# Patient Record
Sex: FEMALE | Race: BLACK OR AFRICAN AMERICAN | NOT HISPANIC OR LATINO | URBAN - METROPOLITAN AREA
[De-identification: names, ages, dates, MRNs, and addresses within clinical notes are randomized per-mention and may not be internally consistent; named-entity substitution may affect disease eponyms.]

---

## 2020-08-28 ENCOUNTER — EMERGENCY (EMERGENCY)
Facility: HOSPITAL | Age: 60
LOS: 0 days | Discharge: HOME | End: 2020-08-29
Attending: EMERGENCY MEDICINE
Payer: MEDICAID

## 2020-08-28 VITALS
OXYGEN SATURATION: 96 % | WEIGHT: 110.01 LBS | DIASTOLIC BLOOD PRESSURE: 73 MMHG | RESPIRATION RATE: 20 BRPM | TEMPERATURE: 98 F | HEART RATE: 85 BPM | HEIGHT: 64 IN | SYSTOLIC BLOOD PRESSURE: 110 MMHG

## 2020-08-28 DIAGNOSIS — G47.00 INSOMNIA, UNSPECIFIED: ICD-10-CM

## 2020-08-28 DIAGNOSIS — R63.0 ANOREXIA: ICD-10-CM

## 2020-08-28 DIAGNOSIS — I10 ESSENTIAL (PRIMARY) HYPERTENSION: ICD-10-CM

## 2020-08-28 DIAGNOSIS — R07.9 CHEST PAIN, UNSPECIFIED: ICD-10-CM

## 2020-08-28 DIAGNOSIS — R00.2 PALPITATIONS: ICD-10-CM

## 2020-08-28 DIAGNOSIS — R42 DIZZINESS AND GIDDINESS: ICD-10-CM

## 2020-08-28 LAB
ALBUMIN SERPL ELPH-MCNC: 3.4 G/DL — SIGNIFICANT CHANGE UP (ref 3.3–5)
ALP SERPL-CCNC: 80 U/L — SIGNIFICANT CHANGE UP (ref 40–120)
ALT FLD-CCNC: 24 U/L — SIGNIFICANT CHANGE UP (ref 12–78)
ANION GAP SERPL CALC-SCNC: 9 MMOL/L — SIGNIFICANT CHANGE UP (ref 5–17)
AST SERPL-CCNC: 27 U/L — SIGNIFICANT CHANGE UP (ref 15–37)
BASOPHILS # BLD AUTO: 0.09 K/UL — SIGNIFICANT CHANGE UP (ref 0–0.2)
BASOPHILS NFR BLD AUTO: 1.1 % — SIGNIFICANT CHANGE UP (ref 0–2)
BILIRUB SERPL-MCNC: 0.4 MG/DL — SIGNIFICANT CHANGE UP (ref 0.2–1.2)
BUN SERPL-MCNC: 8 MG/DL — SIGNIFICANT CHANGE UP (ref 7–23)
CALCIUM SERPL-MCNC: 8.3 MG/DL — LOW (ref 8.5–10.1)
CHLORIDE SERPL-SCNC: 111 MMOL/L — HIGH (ref 96–108)
CO2 SERPL-SCNC: 22 MMOL/L — SIGNIFICANT CHANGE UP (ref 22–31)
CREAT SERPL-MCNC: 0.53 MG/DL — SIGNIFICANT CHANGE UP (ref 0.5–1.3)
EOSINOPHIL # BLD AUTO: 0.14 K/UL — SIGNIFICANT CHANGE UP (ref 0–0.5)
EOSINOPHIL NFR BLD AUTO: 1.7 % — SIGNIFICANT CHANGE UP (ref 0–6)
ETHANOL SERPL-MCNC: 101 MG/DL — HIGH (ref 0–10)
GLUCOSE SERPL-MCNC: 65 MG/DL — LOW (ref 70–99)
HCT VFR BLD CALC: 36.5 % — SIGNIFICANT CHANGE UP (ref 34.5–45)
HGB BLD-MCNC: 12.5 G/DL — SIGNIFICANT CHANGE UP (ref 11.5–15.5)
IMM GRANULOCYTES NFR BLD AUTO: 0.2 % — SIGNIFICANT CHANGE UP (ref 0–1.5)
LACTATE SERPL-SCNC: 2.6 MMOL/L — HIGH (ref 0.7–2)
LIDOCAIN IGE QN: 130 U/L — SIGNIFICANT CHANGE UP (ref 73–393)
LYMPHOCYTES # BLD AUTO: 3.17 K/UL — SIGNIFICANT CHANGE UP (ref 1–3.3)
LYMPHOCYTES # BLD AUTO: 39.4 % — SIGNIFICANT CHANGE UP (ref 13–44)
MAGNESIUM SERPL-MCNC: 2.2 MG/DL — SIGNIFICANT CHANGE UP (ref 1.6–2.6)
MCHC RBC-ENTMCNC: 29.9 PG — SIGNIFICANT CHANGE UP (ref 27–34)
MCHC RBC-ENTMCNC: 34.2 GM/DL — SIGNIFICANT CHANGE UP (ref 32–36)
MCV RBC AUTO: 87.3 FL — SIGNIFICANT CHANGE UP (ref 80–100)
MONOCYTES # BLD AUTO: 0.71 K/UL — SIGNIFICANT CHANGE UP (ref 0–0.9)
MONOCYTES NFR BLD AUTO: 8.8 % — SIGNIFICANT CHANGE UP (ref 2–14)
NEUTROPHILS # BLD AUTO: 3.91 K/UL — SIGNIFICANT CHANGE UP (ref 1.8–7.4)
NEUTROPHILS NFR BLD AUTO: 48.8 % — SIGNIFICANT CHANGE UP (ref 43–77)
NRBC # BLD: 0 /100 WBCS — SIGNIFICANT CHANGE UP (ref 0–0)
PLATELET # BLD AUTO: 291 K/UL — SIGNIFICANT CHANGE UP (ref 150–400)
POTASSIUM SERPL-MCNC: 3.7 MMOL/L — SIGNIFICANT CHANGE UP (ref 3.5–5.3)
POTASSIUM SERPL-SCNC: 3.7 MMOL/L — SIGNIFICANT CHANGE UP (ref 3.5–5.3)
PROT SERPL-MCNC: 7.1 GM/DL — SIGNIFICANT CHANGE UP (ref 6–8.3)
RBC # BLD: 4.18 M/UL — SIGNIFICANT CHANGE UP (ref 3.8–5.2)
RBC # FLD: 13.2 % — SIGNIFICANT CHANGE UP (ref 10.3–14.5)
SODIUM SERPL-SCNC: 142 MMOL/L — SIGNIFICANT CHANGE UP (ref 135–145)
TROPONIN I SERPL-MCNC: <.015 NG/ML — SIGNIFICANT CHANGE UP (ref 0.01–0.04)
TROPONIN I SERPL-MCNC: <.015 NG/ML — SIGNIFICANT CHANGE UP (ref 0.01–0.04)
TSH SERPL-MCNC: 0.59 UIU/ML — SIGNIFICANT CHANGE UP (ref 0.36–3.74)
WBC # BLD: 8.04 K/UL — SIGNIFICANT CHANGE UP (ref 3.8–10.5)
WBC # FLD AUTO: 8.04 K/UL — SIGNIFICANT CHANGE UP (ref 3.8–10.5)

## 2020-08-28 PROCEDURE — 71045 X-RAY EXAM CHEST 1 VIEW: CPT | Mod: 26

## 2020-08-28 PROCEDURE — 93010 ELECTROCARDIOGRAM REPORT: CPT

## 2020-08-28 PROCEDURE — 99285 EMERGENCY DEPT VISIT HI MDM: CPT

## 2020-08-28 RX ORDER — SODIUM CHLORIDE 9 MG/ML
1000 INJECTION INTRAMUSCULAR; INTRAVENOUS; SUBCUTANEOUS ONCE
Refills: 0 | Status: COMPLETED | OUTPATIENT
Start: 2020-08-28 | End: 2020-08-28

## 2020-08-28 RX ORDER — ONDANSETRON 8 MG/1
4 TABLET, FILM COATED ORAL ONCE
Refills: 0 | Status: COMPLETED | OUTPATIENT
Start: 2020-08-28 | End: 2020-08-28

## 2020-08-28 RX ORDER — MECLIZINE HCL 12.5 MG
50 TABLET ORAL ONCE
Refills: 0 | Status: COMPLETED | OUTPATIENT
Start: 2020-08-28 | End: 2020-08-28

## 2020-08-28 RX ADMIN — SODIUM CHLORIDE 1000 MILLILITER(S): 9 INJECTION INTRAMUSCULAR; INTRAVENOUS; SUBCUTANEOUS at 21:18

## 2020-08-28 NOTE — ED PROVIDER NOTE - CLINICAL SUMMARY MEDICAL DECISION MAKING FREE TEXT BOX
Pt well appearing. Pt further elaborates and reports her main complain is lack of appetite and insomnia and that causes her to be lightheaded. Lab values do not require emergent intervention. CT scan demonstrates no acute pathology. advise fu with pmd. pt felt better after sleeping in ER.

## 2020-08-28 NOTE — ED PROVIDER NOTE - OBJECTIVE STATEMENT
58 y/o F with pmhx HTN pw dizziness x2 weeks and sensation of room spinning when she moves from side to side. She also reports x2 months of occasional CP, palpitations and decreased appetite with weight loss and inability to sleep. Pt does drink alcohol occasionally.    No fever/chills, No photophobia/eye pain/changes in vision, No ear pain/sore throat/dysphagia, No chest pain/palpitations, no SOB/cough/wheeze/stridor, No abdominal pain, No N/V/D, no dysuria/frequency/discharge, No neck/back pain, no rash, no changes in neurological status/function. + dizziness. 60 y/o F with pmhx HTN pw dizziness x2 weeks and sensation of room spinning when she moves from side to side. She also reports x2 months of occasional CP, palpitations and decreased appetite with weight loss and inability to sleep. Pt has been drinking past 2-3 days for insomnia/anxiety. denies depression, si/hi/ah/vh.     No fever/chills, No photophobia/eye pain/changes in vision, No ear pain/sore throat/dysphagia, + chest pain, no palpitations, no SOB/cough/wheeze/stridor, No abdominal pain, No V/D, no dysuria/frequency/discharge, No neck/back pain, no rash, no changes in neurological status/function. + dizziness.

## 2020-08-28 NOTE — ED PROVIDER NOTE - PATIENT PORTAL LINK FT
You can access the FollowMyHealth Patient Portal offered by [facility] by registering at the following website: [email or web address]. By joining [employer]’s FollowMyHealth portal, you will also be able to view your health information using other applications (apps) compatible with our system.

## 2020-08-28 NOTE — ED ADULT NURSE NOTE - ASSOCIATED SYMPTOMS
c/o insomnia and states " I don't feel good today". pt admits to drinking beers to help fall asleep. no withdraws symptoms noted.

## 2020-08-28 NOTE — ED PROVIDER NOTE - NSFOLLOWUPINSTRUCTIONS_ED_ALL_ED_FT
Follow up with your primary doctor for medication for insomnia and your appetite.    Follow up with your primary care doctor within the next 24-48 hours and bring copy of your results.  Return to the Emergency Department for worsening or persistent symptoms or any other concerns incl. chest pain, shortness of breath, dizziness, inability to tolerate oral intake.  Rest, drink plenty of fluids.  Advance activity as tolerated.  Continue all previously prescribed medications as directed.

## 2020-08-28 NOTE — ED PROVIDER NOTE - PHYSICAL EXAMINATION
Gen: Alert, Well appearing. NAD    Head: NC, AT, PERRL, normal lids/conjunctiva   ENT: Bilateral TM WNL, patent oropharynx without erythema/exudate, uvula midline  Neck: supple, no tenderness/meningismus  Pulm: Bilateral clear BS, normal resp effort  CV: RRR, no M/R/G, +dist pulses   Abd: soft, NT/+ distended/bloated, +BS, no guarding/rebound tenderness  Mskel: extremities x4 with normal ROM. no ctl spine ttp. no edema/erythema/cyanosis   Skin: no rash, no bruising  Neuro: AAOx3, no sensory/motor deficits. CN2-12 grossly intact, No pronator drift. Normal finger to nose, heel to shin. No dysdiadochokinesis. Full power and sensation intact. Gait WNL.

## 2020-08-28 NOTE — ED PROVIDER NOTE - CARE PROVIDERS DIRECT ADDRESSES
[email or web address]

## 2020-08-29 VITALS
TEMPERATURE: 98 F | SYSTOLIC BLOOD PRESSURE: 138 MMHG | DIASTOLIC BLOOD PRESSURE: 78 MMHG | HEART RATE: 100 BPM | RESPIRATION RATE: 18 BRPM | OXYGEN SATURATION: 100 %

## 2020-08-29 LAB
APTT BLD: 36.2 SEC — HIGH (ref 27.5–35.5)
GLUCOSE BLDC GLUCOMTR-MCNC: 81 MG/DL — SIGNIFICANT CHANGE UP (ref 70–99)
INR BLD: 1.03 RATIO — SIGNIFICANT CHANGE UP (ref 0.88–1.16)
LACTATE SERPL-SCNC: 0.7 MMOL/L — SIGNIFICANT CHANGE UP (ref 0.7–2)
LACTATE SERPL-SCNC: 2.7 MMOL/L — HIGH (ref 0.7–2)
PROTHROM AB SERPL-ACNC: 11.9 SEC — SIGNIFICANT CHANGE UP (ref 10.6–13.6)
TROPONIN I SERPL-MCNC: <.015 NG/ML — SIGNIFICANT CHANGE UP (ref 0.01–0.04)

## 2020-08-29 PROCEDURE — 71275 CT ANGIOGRAPHY CHEST: CPT | Mod: 26

## 2020-08-29 PROCEDURE — 93010 ELECTROCARDIOGRAM REPORT: CPT

## 2020-08-29 PROCEDURE — 70450 CT HEAD/BRAIN W/O DYE: CPT | Mod: 26

## 2020-08-29 PROCEDURE — 74177 CT ABD & PELVIS W/CONTRAST: CPT | Mod: 26

## 2020-08-29 RX ORDER — SODIUM CHLORIDE 9 MG/ML
1000 INJECTION INTRAMUSCULAR; INTRAVENOUS; SUBCUTANEOUS ONCE
Refills: 0 | Status: COMPLETED | OUTPATIENT
Start: 2020-08-29 | End: 2020-08-29

## 2020-08-29 RX ORDER — MECLIZINE HCL 12.5 MG
50 TABLET ORAL ONCE
Refills: 0 | Status: COMPLETED | OUTPATIENT
Start: 2020-08-29 | End: 2020-08-29

## 2020-08-29 RX ADMIN — Medication 50 MILLIGRAM(S): at 04:20

## 2020-08-29 RX ADMIN — SODIUM CHLORIDE 1000 MILLILITER(S): 9 INJECTION INTRAMUSCULAR; INTRAVENOUS; SUBCUTANEOUS at 04:21

## 2020-08-29 RX ADMIN — SODIUM CHLORIDE 1000 MILLILITER(S): 9 INJECTION INTRAMUSCULAR; INTRAVENOUS; SUBCUTANEOUS at 01:05

## 2020-08-29 RX ADMIN — Medication 0.5 MILLIGRAM(S): at 04:20

## 2020-08-29 NOTE — ED ADULT NURSE REASSESSMENT NOTE - NS ED NURSE REASSESS COMMENT FT1
multiple attempts made to convince pt to keep arm straight to obtain 2nd NS to help decrease lactate, however pt is non-cooperative. PT made aware lactate has not improved however is non complaint . Dr. thorpe made aware.

## 2021-07-06 ENCOUNTER — EMERGENCY (EMERGENCY)
Facility: HOSPITAL | Age: 61
LOS: 1 days | Discharge: HOME | End: 2021-07-06
Attending: EMERGENCY MEDICINE | Admitting: EMERGENCY MEDICINE
Payer: MEDICAID

## 2021-07-06 VITALS
OXYGEN SATURATION: 100 % | HEART RATE: 94 BPM | HEIGHT: 64 IN | TEMPERATURE: 99 F | SYSTOLIC BLOOD PRESSURE: 131 MMHG | DIASTOLIC BLOOD PRESSURE: 83 MMHG | RESPIRATION RATE: 18 BRPM

## 2021-07-06 PROCEDURE — 99284 EMERGENCY DEPT VISIT MOD MDM: CPT

## 2021-07-06 PROCEDURE — 71101 X-RAY EXAM UNILAT RIBS/CHEST: CPT | Mod: 26,LT

## 2021-07-06 RX ORDER — OXYCODONE HYDROCHLORIDE 5 MG/1
5 TABLET ORAL ONCE
Refills: 0 | Status: DISCONTINUED | OUTPATIENT
Start: 2021-07-06 | End: 2021-07-06

## 2021-07-06 RX ORDER — IBUPROFEN 200 MG
600 TABLET ORAL ONCE
Refills: 0 | Status: COMPLETED | OUTPATIENT
Start: 2021-07-06 | End: 2021-07-06

## 2021-07-06 RX ORDER — LIDOCAINE 4 G/100G
1 CREAM TOPICAL ONCE
Refills: 0 | Status: COMPLETED | OUTPATIENT
Start: 2021-07-06 | End: 2021-07-06

## 2021-07-06 RX ADMIN — OXYCODONE HYDROCHLORIDE 5 MILLIGRAM(S): 5 TABLET ORAL at 22:22

## 2021-07-06 RX ADMIN — LIDOCAINE 1 PATCH: 4 CREAM TOPICAL at 22:21

## 2021-07-06 RX ADMIN — Medication 600 MILLIGRAM(S): at 22:21

## 2021-07-06 NOTE — ED PROVIDER NOTE - PATIENT PORTAL LINK FT
You can access the FollowMyHealth Patient Portal offered by [facility] by registering at the following website: [email or web address]. By joining [employer]’s FollowMyHealth portal, you will also be able to view your health information using other applications (apps) compatible with our system.

## 2021-07-06 NOTE — ED PROVIDER NOTE - CLINICAL SUMMARY MEDICAL DECISION MAKING FREE TEXT BOX
61 y/o F presenting with left chest wall contusion s/p slip and fall in bathroom. symptoms likely due to rib contusion will r/o fracture. plan for analgesic, xray

## 2021-07-06 NOTE — ED PROVIDER NOTE - OBJECTIVE STATEMENT
translation service: [id number] translation service: [id number]    Patient is a 59 y/o F with hx of HTN presenting with left chest wall s/p fall in bathroom x 1 day ago. She reports slipping on floor and hitting her chest against edge of sink. She endorses pain with movement and deep inspiration. She denies sustaining any other injuries, no head trauma, LOC. no blood thinner use.

## 2021-07-06 NOTE — ED ADULT NURSE NOTE - NSIMPLEMENTINTERV_GEN_ALL_ED
Implemented All Fall Risk Interventions:  [employer] to call system. Call bell, personal items and telephone within reach. Instruct patient to call for assistance. Room bathroom lighting operational. Non-slip footwear when patient is off stretcher. Physically safe environment: no spills, clutter or unnecessary equipment. Stretcher in lowest position, wheels locked, appropriate side rails in place. Provide visual cue, wrist band, yellow gown, etc. Monitor gait and stability. Monitor for mental status changes and reorient to person, place, and time. Review medications for side effects contributing to fall risk. Reinforce activity limits and safety measures with patient and family.

## 2021-07-06 NOTE — ED PROVIDER NOTE - ATTENDING CONTRIBUTION TO CARE
I have seen and examined the patient on the patient´s visit date. I have reviewed the note written by Omar Arana [facility], on that visit day. I have supervised and participated as necessary in the performance of procedures indicated for patient management and was available at all phases of the patient´s visit when needed. We discussed the history, physical exam findings, mnagement plan, and  medical decision making. I have made my additons, exceptions, and revisions within the chart and I agree with H and P as documented in its entirety. The data and my interpretation of any data collected from labs, interventions and imaging appear below as well as my independent medical decision making and considerations    The patient is a 60y Female who has a past medical and surgery history of  PTED with pt c/o left rib pain s/p slip and fall on the bathroom sink yesterday as described    Vital Signs Last 24 Hrs  T(F): 99.1 HR: 94 BP: 131/83 RR: 18 SpO2: 100% (06 Jul 2021 19:58  PE: as described; my additions and exceptions are noted in the chart    DATA:  EKG: pending at time of evaluation  LAB: Pending at time of evaluation     IMPRESSION/RISK:  Dx=chest wall pain   Consideration include r/o rib fracture and complications associated with it   Plan  analgesics  cxr  reassess   dispo as per results and response

## 2021-07-06 NOTE — ED PROVIDER NOTE - NSFOLLOWUPINSTRUCTIONS_ED_ALL_ED_FT
Rib Contusion    A rib contusion is a deep bruise on your rib area. Contusions are the result of a blunt trauma that causes bleeding and injury to the tissues under the skin. A rib contusion may involve bruising of the ribs and of the skin and muscles in the area. The skin over the contusion may turn blue, purple, or yellow. Minor injuries will give you a painless contusion. More severe contusions may stay painful and swollen for a few weeks.    What are the causes?    This condition is usually caused by a blow, trauma, or direct force to an area of the body. This often occurs while playing contact sports.    What are the signs or symptoms?  Symptoms of this condition include:  •Swelling and redness of the injured area.    •Discoloration of the injured area.    •Tenderness and soreness of the injured area.    •Pain with or without movement.    How is this diagnosed?  This condition may be diagnosed based on:  •Your symptoms and medical history.    •A physical exam.    •Imaging tests—such as an X-ray, CT scan, or MRI—to determine if there were internal injuries or broken bones (fractures).    How is this treated?  This condition may be treated with:  •Rest. This is often the best treatment for a rib contusion.    •Icing. This reduces swelling and inflammation.    •Deep-breathing exercises. These may be recommended to reduce the risk for lung collapse and pneumonia.    •Medicines. Over-the-counter or prescription medicines may be given to control pain.    •Injection of a numbing medicine around the nerve near your injury (nerve block).    Follow these instructions at home:    Medicines     •Take over-the-counter and prescription medicines only as told by your health care provider.    • Do not drive or use heavy machinery while taking prescription pain medicine.    •If you are taking prescription pain medicine, take actions to prevent or treat constipation. Your health care provider may recommend that you:   •Drink enough fluid to keep your urine pale yellow.     •Eat foods that are high in fiber, such as fresh fruits and vegetables, whole grains, and beans.     •Limit foods that are high in fat and processed sugars, such as fried or sweet foods.     •Take an over-the-counter or prescription medicine for constipation.    Managing pain, stiffness, and swelling   •If directed, put ice on the injured area:  •Put ice in a plastic bag.    •Place a towel between your skin and the bag.    •Leave the ice on for 20 minutes, 2–3 times a day.      •Rest the injured area. Avoid strenuous activity and any activities or movements that cause pain. Be careful during activities and avoid bumping the injured area.    • Do not lift anything that is heavier than 5 lb (2.3 kg), or the limit that you are told, until your health care provider says that it is safe.    General instructions     • Do not use any products that contain nicotine or tobacco, such as cigarettes and e-cigarettes. These can delay healing. If you need help quitting, ask your health care provider.    •Do deep-breathing exercises as told by your health care provider.    •If you were given an incentive spirometer, use it every 1–2 hours while you are awake, or as recommended by your health care provider. This device measures how well you are filling your lungs with each breath.    •Keep all follow-up visits as told by your health care provider. This is important.    Contact a health care provider if you have:    •Increased bruising or swelling.    •Pain that is not controlled with treatment.    •A fever.    Get help right away if you:    •Have difficulty breathing or shortness of breath.    •Develop a continual cough or you cough up thick or bloody sputum.    •Feel nauseous or you vomit.    •Have pain in your abdomen.

## 2021-07-06 NOTE — ED PROVIDER NOTE - CARDIAC, MLM
+ tenderness to the left lateral upper chest wall. no bony deformities, paradoxical movement,  Normal rate, regular rhythm.  Heart sounds S1, S2.  No murmurs, rubs or gallops.

## 2021-07-07 VITALS
SYSTOLIC BLOOD PRESSURE: 149 MMHG | RESPIRATION RATE: 20 BRPM | TEMPERATURE: 98 F | HEART RATE: 93 BPM | OXYGEN SATURATION: 100 % | DIASTOLIC BLOOD PRESSURE: 89 MMHG

## 2021-07-07 RX ORDER — LIDOCAINE 4 G/100G
1 CREAM TOPICAL
Qty: 6 | Refills: 0
Start: 2021-07-07

## 2021-07-07 RX ORDER — IBUPROFEN 200 MG
1 TABLET ORAL
Qty: 25 | Refills: 0
Start: 2021-07-07

## 2021-07-07 RX ORDER — OXYCODONE HYDROCHLORIDE 5 MG/1
1 TABLET ORAL
Qty: 12 | Refills: 0
Start: 2021-07-07

## 2021-07-07 NOTE — PROVIDER CONTACT NOTE (OTHER) - ASSESSMENT
Pt reports she does not have anyone to pick her up; has no money for taxi and cannot take public transport at this time.  Pt has Medicaid and is eligible for taxi via [facility].

## 2021-07-07 NOTE — PROVIDER CONTACT NOTE (OTHER) - RECOMMENDATIONS
VALENTÍN arranged for taxi with [employer]; [facility] invoice # [id number]  [phone]

## 2022-05-12 NOTE — ED ADULT NURSE NOTE - NS ED NURSE DISCH DISPOSITION
Patient Education     Tick Bites  Ticks are small arachnids that feed on the blood of rodents, rabbits, birds, deer, dogs, and people. A tick bite may cause redness, itching, and slight swelling at the site. Sometimes you may have no reaction where the tick bit you.  Ticks transmit disease when microbes in their saliva get into your skin and blood. There are over 800 species of ticks. But only two families of ticks, hard ticks and soft ticks, are known to transmit diseases to humans. Ticks often transmit a disease near the end of a meal. The hard ticks tend to attach and feed for hours to days. It may take hours before a hard tick transmits microbes. Soft ticks often feed for less than 1 hour and can transmit diseases quickly. The bites themselves aren't cause for concern. But ticks can carry and pass on 12 different illnesses. These include Lyme disease and [place] spotted fever.  Symptoms of tick-related diseases vary depending on the disease. The most common symptoms are:  · Fever  · Chills  · Aches and pains such as headache, extreme tiredness (fatigue), and muscle aches  · Joint pain (with Lyme disease)  · Rash     A \"bull's eye\" rash is a common symptom of Lyme disease.   How to remove a tick  Not all ticks carry disease. A tick attached to you anywhere from minutes to days may infect you, depending on the type of tick and the germs it carries. If you find a tick, don't panic.  · Try to carefully remove the tick with tweezers.  · Grasp the tick near its head as close to the skin’s surface as possible. Pull without twisting and don't crush the body.  · After removing the tick, thoroughly clean the bite area and your hands with rubbing alcohol or soap and water.  · Put a live tick in alcohol, or in a sealed bag or container, or flush it down the toilet.  When to get medical care  If you can't easily remove the tick or if you leave the head in your skin, get medical care right away.  Tick paralysis is a  rare disease thought to be caused by a toxin in tick saliva. The symptoms include:  · Weakness  · Paralysis  · Confusion  · Fever  · Numbness  · Headaches  · Rashes  If you or someone bitten by a tick has these symptoms, get medical care right away. Removing the tick stops the symptoms in about 24 hours.  If you have a rash or fever within a few weeks of removing a tick, see your healthcare provider. Tell the provider about your recent tick bite, when the bite occurred, and where you most likely acquired the tick.  · To prevent disease, you may be given antibiotics. Both Lyme disease and [place] spotted fever respond quickly to these medicines.  · You may be asked to see your healthcare provider for a blood test. This is to check for Lyme or another tick-related disease.  Follow-up care  Some states and [facility] have services that test ticks for Lyme disease and other diseases. Check with your local officials to see if this service is available in your area.  If you remove a tick yourself, watch for signs of a tick-borne illness. Symptoms may show up in a few days or weeks after a bite. Call your healthcare provider if you notice any of the following:  · Rash. This may spread outward in a ring from a hard, white lump. Or it may move up your arms and legs to your chest.  · Fever  · Chills  · Body aches, joint swelling, and pain  · Severe headache  Dwayne last reviewed this educational content on 8/1/2019  © 4939-7112 [employer]. All rights reserved. This information is not intended as a substitute for professional medical care. Always follow your healthcare professional's instructions.            Discharged

## 2023-08-16 NOTE — ED PROVIDER NOTE - CARE PROVIDER_API CALL
Romeo Reaves  INTERNAL MEDICINE  [address] [place] [address], [address]  [place] [address]  Phone: [phone]  Fax: [phone]  Follow Up Time: c/o right 2nd toe ulceration and redness on right foot. AO4, ambulatory. NAD.
